# Patient Record
Sex: FEMALE | Race: WHITE | NOT HISPANIC OR LATINO | URBAN - METROPOLITAN AREA
[De-identification: names, ages, dates, MRNs, and addresses within clinical notes are randomized per-mention and may not be internally consistent; named-entity substitution may affect disease eponyms.]

---

## 2017-05-01 ENCOUNTER — OUTPATIENT (OUTPATIENT)
Dept: OUTPATIENT SERVICES | Facility: HOSPITAL | Age: 72
LOS: 1 days | End: 2017-05-01
Payer: COMMERCIAL

## 2017-05-01 ENCOUNTER — APPOINTMENT (OUTPATIENT)
Dept: MRI IMAGING | Facility: IMAGING CENTER | Age: 72
End: 2017-05-01

## 2017-05-01 DIAGNOSIS — Z00.8 ENCOUNTER FOR OTHER GENERAL EXAMINATION: ICD-10-CM

## 2017-05-01 PROCEDURE — 72148 MRI LUMBAR SPINE W/O DYE: CPT

## 2023-11-14 ENCOUNTER — EMERGENCY (EMERGENCY)
Facility: HOSPITAL | Age: 78
LOS: 1 days | Discharge: HOME | End: 2023-11-14
Attending: STUDENT IN AN ORGANIZED HEALTH CARE EDUCATION/TRAINING PROGRAM
Payer: MEDICARE

## 2023-11-14 VITALS
RESPIRATION RATE: 14 BRPM | TEMPERATURE: 98 F | OXYGEN SATURATION: 99 % | DIASTOLIC BLOOD PRESSURE: 85 MMHG | HEART RATE: 87 BPM | SYSTOLIC BLOOD PRESSURE: 162 MMHG

## 2023-11-14 VITALS
HEART RATE: 95 BPM | DIASTOLIC BLOOD PRESSURE: 86 MMHG | TEMPERATURE: 98 F | RESPIRATION RATE: 16 BRPM | OXYGEN SATURATION: 98 % | SYSTOLIC BLOOD PRESSURE: 176 MMHG | HEIGHT: 64 IN | WEIGHT: 125 LBS

## 2023-11-14 LAB
ALBUMIN SERPL ELPH-MCNC: 4.4 G/DL — SIGNIFICANT CHANGE UP (ref 3.3–5)
ALBUMIN SERPL ELPH-MCNC: 4.4 G/DL — SIGNIFICANT CHANGE UP (ref 3.3–5)
ALP SERPL-CCNC: 70 U/L — SIGNIFICANT CHANGE UP (ref 40–120)
ALP SERPL-CCNC: 70 U/L — SIGNIFICANT CHANGE UP (ref 40–120)
ALT FLD-CCNC: 14 U/L — SIGNIFICANT CHANGE UP (ref 10–45)
ALT FLD-CCNC: 14 U/L — SIGNIFICANT CHANGE UP (ref 10–45)
ANION GAP SERPL CALC-SCNC: 12 MMOL/L — SIGNIFICANT CHANGE UP (ref 5–17)
ANION GAP SERPL CALC-SCNC: 12 MMOL/L — SIGNIFICANT CHANGE UP (ref 5–17)
APTT BLD: 33.4 SEC — SIGNIFICANT CHANGE UP (ref 24.5–35.6)
APTT BLD: 33.4 SEC — SIGNIFICANT CHANGE UP (ref 24.5–35.6)
AST SERPL-CCNC: 23 U/L — SIGNIFICANT CHANGE UP (ref 10–40)
AST SERPL-CCNC: 23 U/L — SIGNIFICANT CHANGE UP (ref 10–40)
BASE EXCESS BLDV CALC-SCNC: 0.8 MMOL/L — SIGNIFICANT CHANGE UP (ref -2–3)
BASE EXCESS BLDV CALC-SCNC: 0.8 MMOL/L — SIGNIFICANT CHANGE UP (ref -2–3)
BASOPHILS # BLD AUTO: 0.02 K/UL — SIGNIFICANT CHANGE UP (ref 0–0.2)
BASOPHILS # BLD AUTO: 0.02 K/UL — SIGNIFICANT CHANGE UP (ref 0–0.2)
BASOPHILS NFR BLD AUTO: 0.5 % — SIGNIFICANT CHANGE UP (ref 0–2)
BASOPHILS NFR BLD AUTO: 0.5 % — SIGNIFICANT CHANGE UP (ref 0–2)
BILIRUB SERPL-MCNC: 0.3 MG/DL — SIGNIFICANT CHANGE UP (ref 0.2–1.2)
BILIRUB SERPL-MCNC: 0.3 MG/DL — SIGNIFICANT CHANGE UP (ref 0.2–1.2)
BUN SERPL-MCNC: 9 MG/DL — SIGNIFICANT CHANGE UP (ref 7–23)
BUN SERPL-MCNC: 9 MG/DL — SIGNIFICANT CHANGE UP (ref 7–23)
CA-I SERPL-SCNC: 1.2 MMOL/L — SIGNIFICANT CHANGE UP (ref 1.15–1.33)
CA-I SERPL-SCNC: 1.2 MMOL/L — SIGNIFICANT CHANGE UP (ref 1.15–1.33)
CALCIUM SERPL-MCNC: 9.2 MG/DL — SIGNIFICANT CHANGE UP (ref 8.4–10.5)
CALCIUM SERPL-MCNC: 9.2 MG/DL — SIGNIFICANT CHANGE UP (ref 8.4–10.5)
CHLORIDE BLDV-SCNC: 104 MMOL/L — SIGNIFICANT CHANGE UP (ref 96–108)
CHLORIDE BLDV-SCNC: 104 MMOL/L — SIGNIFICANT CHANGE UP (ref 96–108)
CHLORIDE SERPL-SCNC: 105 MMOL/L — SIGNIFICANT CHANGE UP (ref 96–108)
CHLORIDE SERPL-SCNC: 105 MMOL/L — SIGNIFICANT CHANGE UP (ref 96–108)
CO2 BLDV-SCNC: 27 MMOL/L — HIGH (ref 22–26)
CO2 BLDV-SCNC: 27 MMOL/L — HIGH (ref 22–26)
CO2 SERPL-SCNC: 24 MMOL/L — SIGNIFICANT CHANGE UP (ref 22–31)
CO2 SERPL-SCNC: 24 MMOL/L — SIGNIFICANT CHANGE UP (ref 22–31)
CREAT SERPL-MCNC: 0.58 MG/DL — SIGNIFICANT CHANGE UP (ref 0.5–1.3)
CREAT SERPL-MCNC: 0.58 MG/DL — SIGNIFICANT CHANGE UP (ref 0.5–1.3)
EGFR: 93 ML/MIN/1.73M2 — SIGNIFICANT CHANGE UP
EGFR: 93 ML/MIN/1.73M2 — SIGNIFICANT CHANGE UP
EOSINOPHIL # BLD AUTO: 0.04 K/UL — SIGNIFICANT CHANGE UP (ref 0–0.5)
EOSINOPHIL # BLD AUTO: 0.04 K/UL — SIGNIFICANT CHANGE UP (ref 0–0.5)
EOSINOPHIL NFR BLD AUTO: 1 % — SIGNIFICANT CHANGE UP (ref 0–6)
EOSINOPHIL NFR BLD AUTO: 1 % — SIGNIFICANT CHANGE UP (ref 0–6)
GAS PNL BLDV: 136 MMOL/L — SIGNIFICANT CHANGE UP (ref 136–145)
GAS PNL BLDV: 136 MMOL/L — SIGNIFICANT CHANGE UP (ref 136–145)
GAS PNL BLDV: SIGNIFICANT CHANGE UP
GLUCOSE BLDC GLUCOMTR-MCNC: 107 MG/DL — HIGH (ref 70–99)
GLUCOSE BLDC GLUCOMTR-MCNC: 107 MG/DL — HIGH (ref 70–99)
GLUCOSE BLDV-MCNC: 104 MG/DL — HIGH (ref 70–99)
GLUCOSE BLDV-MCNC: 104 MG/DL — HIGH (ref 70–99)
GLUCOSE SERPL-MCNC: 105 MG/DL — HIGH (ref 70–99)
GLUCOSE SERPL-MCNC: 105 MG/DL — HIGH (ref 70–99)
HCO3 BLDV-SCNC: 26 MMOL/L — SIGNIFICANT CHANGE UP (ref 22–29)
HCO3 BLDV-SCNC: 26 MMOL/L — SIGNIFICANT CHANGE UP (ref 22–29)
HCT VFR BLD CALC: 38.1 % — SIGNIFICANT CHANGE UP (ref 34.5–45)
HCT VFR BLD CALC: 38.1 % — SIGNIFICANT CHANGE UP (ref 34.5–45)
HCT VFR BLDA CALC: 39 % — SIGNIFICANT CHANGE UP (ref 34.5–46.5)
HCT VFR BLDA CALC: 39 % — SIGNIFICANT CHANGE UP (ref 34.5–46.5)
HGB BLD CALC-MCNC: 12.9 G/DL — SIGNIFICANT CHANGE UP (ref 11.7–16.1)
HGB BLD CALC-MCNC: 12.9 G/DL — SIGNIFICANT CHANGE UP (ref 11.7–16.1)
HGB BLD-MCNC: 12.6 G/DL — SIGNIFICANT CHANGE UP (ref 11.5–15.5)
HGB BLD-MCNC: 12.6 G/DL — SIGNIFICANT CHANGE UP (ref 11.5–15.5)
INR BLD: 1.04 RATIO — SIGNIFICANT CHANGE UP (ref 0.85–1.18)
INR BLD: 1.04 RATIO — SIGNIFICANT CHANGE UP (ref 0.85–1.18)
LACTATE BLDV-MCNC: 0.8 MMOL/L — SIGNIFICANT CHANGE UP (ref 0.5–2)
LACTATE BLDV-MCNC: 0.8 MMOL/L — SIGNIFICANT CHANGE UP (ref 0.5–2)
LYMPHOCYTES # BLD AUTO: 1.15 K/UL — SIGNIFICANT CHANGE UP (ref 1–3.3)
LYMPHOCYTES # BLD AUTO: 1.15 K/UL — SIGNIFICANT CHANGE UP (ref 1–3.3)
LYMPHOCYTES # BLD AUTO: 28.3 % — SIGNIFICANT CHANGE UP (ref 13–44)
LYMPHOCYTES # BLD AUTO: 28.3 % — SIGNIFICANT CHANGE UP (ref 13–44)
MCHC RBC-ENTMCNC: 28.9 PG — SIGNIFICANT CHANGE UP (ref 27–34)
MCHC RBC-ENTMCNC: 28.9 PG — SIGNIFICANT CHANGE UP (ref 27–34)
MCHC RBC-ENTMCNC: 33.1 GM/DL — SIGNIFICANT CHANGE UP (ref 32–36)
MCHC RBC-ENTMCNC: 33.1 GM/DL — SIGNIFICANT CHANGE UP (ref 32–36)
MCV RBC AUTO: 87.4 FL — SIGNIFICANT CHANGE UP (ref 80–100)
MCV RBC AUTO: 87.4 FL — SIGNIFICANT CHANGE UP (ref 80–100)
MONOCYTES # BLD AUTO: 0.36 K/UL — SIGNIFICANT CHANGE UP (ref 0–0.9)
MONOCYTES # BLD AUTO: 0.36 K/UL — SIGNIFICANT CHANGE UP (ref 0–0.9)
MONOCYTES NFR BLD AUTO: 8.8 % — SIGNIFICANT CHANGE UP (ref 2–14)
MONOCYTES NFR BLD AUTO: 8.8 % — SIGNIFICANT CHANGE UP (ref 2–14)
NEUTROPHILS # BLD AUTO: 2.5 K/UL — SIGNIFICANT CHANGE UP (ref 1.8–7.4)
NEUTROPHILS # BLD AUTO: 2.5 K/UL — SIGNIFICANT CHANGE UP (ref 1.8–7.4)
NEUTROPHILS NFR BLD AUTO: 61.4 % — SIGNIFICANT CHANGE UP (ref 43–77)
NEUTROPHILS NFR BLD AUTO: 61.4 % — SIGNIFICANT CHANGE UP (ref 43–77)
NRBC # BLD: 0 /100 WBCS — SIGNIFICANT CHANGE UP (ref 0–0)
NRBC # BLD: 0 /100 WBCS — SIGNIFICANT CHANGE UP (ref 0–0)
PCO2 BLDV: 43 MMHG — HIGH (ref 39–42)
PCO2 BLDV: 43 MMHG — HIGH (ref 39–42)
PH BLDV: 7.39 — SIGNIFICANT CHANGE UP (ref 7.32–7.43)
PH BLDV: 7.39 — SIGNIFICANT CHANGE UP (ref 7.32–7.43)
PLATELET # BLD AUTO: 207 K/UL — SIGNIFICANT CHANGE UP (ref 150–400)
PLATELET # BLD AUTO: 207 K/UL — SIGNIFICANT CHANGE UP (ref 150–400)
PO2 BLDV: 35 MMHG — SIGNIFICANT CHANGE UP (ref 25–45)
PO2 BLDV: 35 MMHG — SIGNIFICANT CHANGE UP (ref 25–45)
POTASSIUM BLDV-SCNC: 3.8 MMOL/L — SIGNIFICANT CHANGE UP (ref 3.5–5.1)
POTASSIUM BLDV-SCNC: 3.8 MMOL/L — SIGNIFICANT CHANGE UP (ref 3.5–5.1)
POTASSIUM SERPL-MCNC: 3.9 MMOL/L — SIGNIFICANT CHANGE UP (ref 3.5–5.3)
POTASSIUM SERPL-MCNC: 3.9 MMOL/L — SIGNIFICANT CHANGE UP (ref 3.5–5.3)
POTASSIUM SERPL-SCNC: 3.9 MMOL/L — SIGNIFICANT CHANGE UP (ref 3.5–5.3)
POTASSIUM SERPL-SCNC: 3.9 MMOL/L — SIGNIFICANT CHANGE UP (ref 3.5–5.3)
PROT SERPL-MCNC: 7 G/DL — SIGNIFICANT CHANGE UP (ref 6–8.3)
PROT SERPL-MCNC: 7 G/DL — SIGNIFICANT CHANGE UP (ref 6–8.3)
PROTHROM AB SERPL-ACNC: 10.9 SEC — SIGNIFICANT CHANGE UP (ref 9.5–13)
PROTHROM AB SERPL-ACNC: 10.9 SEC — SIGNIFICANT CHANGE UP (ref 9.5–13)
RBC # BLD: 4.36 M/UL — SIGNIFICANT CHANGE UP (ref 3.8–5.2)
RBC # BLD: 4.36 M/UL — SIGNIFICANT CHANGE UP (ref 3.8–5.2)
RBC # FLD: 13.1 % — SIGNIFICANT CHANGE UP (ref 10.3–14.5)
RBC # FLD: 13.1 % — SIGNIFICANT CHANGE UP (ref 10.3–14.5)
SAO2 % BLDV: 59.7 % — LOW (ref 67–88)
SAO2 % BLDV: 59.7 % — LOW (ref 67–88)
SODIUM SERPL-SCNC: 141 MMOL/L — SIGNIFICANT CHANGE UP (ref 135–145)
SODIUM SERPL-SCNC: 141 MMOL/L — SIGNIFICANT CHANGE UP (ref 135–145)
TROPONIN T, HIGH SENSITIVITY RESULT: 7 NG/L — SIGNIFICANT CHANGE UP (ref 0–51)
TROPONIN T, HIGH SENSITIVITY RESULT: 7 NG/L — SIGNIFICANT CHANGE UP (ref 0–51)
WBC # BLD: 4.07 K/UL — SIGNIFICANT CHANGE UP (ref 3.8–10.5)
WBC # BLD: 4.07 K/UL — SIGNIFICANT CHANGE UP (ref 3.8–10.5)
WBC # FLD AUTO: 4.07 K/UL — SIGNIFICANT CHANGE UP (ref 3.8–10.5)
WBC # FLD AUTO: 4.07 K/UL — SIGNIFICANT CHANGE UP (ref 3.8–10.5)

## 2023-11-14 PROCEDURE — 82435 ASSAY OF BLOOD CHLORIDE: CPT

## 2023-11-14 PROCEDURE — 93005 ELECTROCARDIOGRAM TRACING: CPT

## 2023-11-14 PROCEDURE — 70498 CT ANGIOGRAPHY NECK: CPT | Mod: 26,MA

## 2023-11-14 PROCEDURE — 83605 ASSAY OF LACTIC ACID: CPT

## 2023-11-14 PROCEDURE — 82962 GLUCOSE BLOOD TEST: CPT

## 2023-11-14 PROCEDURE — 84132 ASSAY OF SERUM POTASSIUM: CPT

## 2023-11-14 PROCEDURE — 84484 ASSAY OF TROPONIN QUANT: CPT

## 2023-11-14 PROCEDURE — 82803 BLOOD GASES ANY COMBINATION: CPT

## 2023-11-14 PROCEDURE — 80053 COMPREHEN METABOLIC PANEL: CPT

## 2023-11-14 PROCEDURE — 99291 CRITICAL CARE FIRST HOUR: CPT | Mod: 25

## 2023-11-14 PROCEDURE — 70450 CT HEAD/BRAIN W/O DYE: CPT | Mod: MA

## 2023-11-14 PROCEDURE — 70496 CT ANGIOGRAPHY HEAD: CPT | Mod: 26,MA

## 2023-11-14 PROCEDURE — 82947 ASSAY GLUCOSE BLOOD QUANT: CPT

## 2023-11-14 PROCEDURE — 85014 HEMATOCRIT: CPT

## 2023-11-14 PROCEDURE — 85730 THROMBOPLASTIN TIME PARTIAL: CPT

## 2023-11-14 PROCEDURE — 99291 CRITICAL CARE FIRST HOUR: CPT

## 2023-11-14 PROCEDURE — 0042T: CPT | Mod: MA

## 2023-11-14 PROCEDURE — 84295 ASSAY OF SERUM SODIUM: CPT

## 2023-11-14 PROCEDURE — 85610 PROTHROMBIN TIME: CPT

## 2023-11-14 PROCEDURE — 85018 HEMOGLOBIN: CPT

## 2023-11-14 PROCEDURE — 85025 COMPLETE CBC W/AUTO DIFF WBC: CPT

## 2023-11-14 PROCEDURE — 70496 CT ANGIOGRAPHY HEAD: CPT | Mod: MA

## 2023-11-14 PROCEDURE — 70498 CT ANGIOGRAPHY NECK: CPT | Mod: MA

## 2023-11-14 PROCEDURE — 82330 ASSAY OF CALCIUM: CPT

## 2023-11-14 PROCEDURE — 70450 CT HEAD/BRAIN W/O DYE: CPT | Mod: 26,MA,XU

## 2023-11-14 RX ORDER — MECLIZINE HCL 12.5 MG
25 TABLET ORAL ONCE
Refills: 0 | Status: COMPLETED | OUTPATIENT
Start: 2023-11-14 | End: 2023-11-14

## 2023-11-14 RX ORDER — SODIUM CHLORIDE 9 MG/ML
1000 INJECTION INTRAMUSCULAR; INTRAVENOUS; SUBCUTANEOUS ONCE
Refills: 0 | Status: COMPLETED | OUTPATIENT
Start: 2023-11-14 | End: 2023-11-14

## 2023-11-14 RX ORDER — MECLIZINE HCL 12.5 MG
1 TABLET ORAL
Qty: 9 | Refills: 0
Start: 2023-11-14 | End: 2023-11-16

## 2023-11-14 RX ADMIN — Medication 25 MILLIGRAM(S): at 11:24

## 2023-11-14 RX ADMIN — SODIUM CHLORIDE 1000 MILLILITER(S): 9 INJECTION INTRAMUSCULAR; INTRAVENOUS; SUBCUTANEOUS at 11:24

## 2023-11-14 NOTE — ED PROVIDER NOTE - PROGRESS NOTE DETAILS
Chan DUMONT: Pt is stable and ready for discharge. Strict return precautions given. All questions answered. Pt is able to ambulate to the restroom and feels better. Pt's son will help pt at home w/ ambulation. Meclizine sent to pharmacy. Chan DUMONT: Pt is stable and ready for discharge. Strict return precautions given. All questions answered. Pt is able to ambulate to the restroom and feels better. Pt's son will help pt at home w/ ambulation. Meclizine sent to pharmacy. Pt has had a thyroid ultrasound this past week for her nodule. Patient reassessed, feels better after meclizine/IVF, ambulatory w/o assistance with steady gait. Discussed case with neurology, impression likely peripheral vertigo, recommending outpatient follow-up. Imaging notable for 4mm aneurysm on CTA, likely incidental finding. Given referral for outpatient neurosurgery. -Ivette Sparks MD (Attending)

## 2023-11-14 NOTE — ED PROVIDER NOTE - NSFOLLOWUPCLINICS_GEN_ALL_ED_FT
[facility]  Neurosurgery  [address], [address]  [place] [address]  Phone: [phone]  Fax:   Follow Up Time: 1-3 Days    [facility]  Neurology  [address] [place] [id number]  Phone: [phone]  Fax:   Follow Up Time: 1-3 Days

## 2023-11-14 NOTE — CONSULT NOTE ADULT - ASSESSMENT
Impression: Sudden onset intermittent positional vertigo with left sided hearing without nystagmus, ataxia, ear symptoms, other focal neurological findings is likely ....     Recommendations:  []Meclizine  []IVF  []Outpatient ENT  []PT/Vestibular Rehab referral  ...........TNK pending CTH and d/w fellow    prelim note  not yet d/w fellow  78 Woman PMHx Dorsalgia p/w new onset, intermittent positional vertigo. CTH/CTP wnl save for thyroid nodules. CTA with 4mm rMCA bifurcation aneurysm     Impression: Sudden onset intermittent positional vertigo without nystagmus, ataxia, ear symptoms, focal neurological deficits, nor reproducible hearing changes is likely peripheral in nature    Recommendations:  []Meclizine 25mg TID  []IV hydration  []Outpatient ENT Referral  []PT/Vestibular Rehab referral  []Outpatient neurosurgery referral for 4 mm aneurysm at the right MCA bifurcation.  []Thyroid nodule management per primary team  []If patient improves and can ambulate without difficulty, no further inpatient neurological workup/treatment indicated   []Please provide information for [email or web address]    Case discussed with elizabeth fellow Dr. Sidney Long under supervision of stroke attending Dr. Deirdre Garcia 78 Woman PMHx Dorsalgia p/w new onset, intermittent positional vertigo. CTH/CTP wnl except for thyroid nodules. CTA with 4mm rMCA bifurcation aneurysm     Impression:   #peripheral vertigo  -Sudden onset intermittent positional vertigo without nystagmus, ataxia, ear symptoms, focal neurological deficits, nor reproducible hearing changes is likely peripheral in nature  #4mm rMCA bifurcation aneurysm     Recommendations:  []Meclizine 25mg TID  []IV hydration  []Outpatient ENT Referral  []PT/Vestibular Rehab referral  []Outpatient neurosurgery referral for 4 mm aneurysm at the right MCA bifurcation.  []Thyroid nodule management per primary team  []If patient improves and can ambulate without difficulty, no further inpatient neurological workup/treatment indicated   []Please provide information for [email or web address]    Case discussed with stroke fellow Dr. Sidney Long under supervision of stroke attending Dr. Deirdre Garcia Brenda Pitt

## 2023-11-14 NOTE — CONSULT NOTE ADULT - SUBJECTIVE AND OBJECTIVE BOX
Neurology - Consult Note    -  Spectra: [phone] ([facility]), [phone] ([facility])  -    HPI: Patient JOSE ARMANDO WAY is a 78y (1945) wo/man with a PMHx significant for ***    (Stroke only)  LKN:  NIHSS:   preMRS:   Pt is not a candidate for tenecteplase due to [outside tenecteplase window / mild, non-disabling deficit]  Pt is not a candidate for mechanical thrombectomy due to no large vessel occlusion on CTA    Review of Systems:  INCOMPLETE   CONSTITUTIONAL: No fevers or chills  EYES AND ENT: No visual changes or no throat pain   NECK: No pain or stiffness  RESPIRATORY: No hemoptysis or shortness of breath  CARDIOVASCULAR: No chest pain or palpitations  GASTROINTESTINAL: No melena or hematochezia  GENITOURINARY: No dysuria or hematuria  NEUROLOGICAL: +As stated in HPI above  SKIN: No itching, burning, rashes, or lesions   All other review of systems is negative unless indicated above.    Allergies:  No Known Allergies      PMHx/PSHx/Family Hx: As above, otherwise see below   No pertinent past medical history        Social Hx:  No current use of tobacco, alcohol, or illicit drugs  Lives with ***    Medications:  MEDICATIONS  (STANDING):    MEDICATIONS  (PRN):      Vitals:  T(C): 36.7 (11-14-23 @ 10:18), Max: 36.7 (11-14-23 @ 10:18)  HR: 95 (11-14-23 @ 10:18) (95 - 95)  BP: 176/86 (11-14-23 @ 10:18) (176/86 - 176/86)  RR: 16 (11-14-23 @ 10:18) (16 - 16)  SpO2: 98% (11-14-23 @ 10:18) (98% - 98%)    Physical Examination: INCOMPLETE  General - NAD  Cardiovascular - Peripheral pulses palpable, no edema  Eyes - Fundoscopy with flat, sharp optic discs and no hemorrhage or exudates; Fundoscopy not well visualized; Fundoscopy not performed due to safety precautions in the setting of the COVID-19 pandemic    Neurologic Exam:  Mental status - Awake, Alert, Oriented to person, place, and time. Speech fluent, repetition and naming intact. Follows simple and complex commands. Attention/concentration, recent and remote memory (including registration and recall), and fund of knowledge intact    Cranial nerves - PERRLA, VFF, EOMI, face sensation (V1-V3) intact b/l, facial strength intact without asymmetry b/l, hearing intact b/l, palate with symmetric elevation, trapezius OR sternocleidomastiod 5/5 strength b/l, tongue midline on protrusion with full lateral movement    Motor - Normal bulk and tone throughout. No pronator drift.  Strength testing            Deltoid      Biceps      Triceps     Wrist Extension    Wrist Flexion     Interossei         R            5                 5               5                     5                              5                        5                 5  L             5                 5               5                     5                              5                        5                 5              Hip Flexion    Hip Extension    Knee Flexion    Knee Extension    Dorsiflexion    Plantar Flexion  R              5                           5                       5                           5                            5                          5  L              5                           5                        5                           5                            5                          5    Sensation - Light touch/temperature OR pain/vibration intact throughout    DTR's -             Biceps      Triceps     Brachioradialis      Patellar    Ankle    Toes/plantar response  R             2+             2+                  2+                       2+            2+                 Down  L              2+             2+                 2+                        2+           2+                 Down    Coordination - Finger to Nose intact b/l. No tremors appreciated    Gait and station - Normal casual gait. Romberg (-)    Labs:                        12.6   4.07  )-----------( 207      ( 14 Nov 2023 10:40 )             38.1           CAPILLARY BLOOD GLUCOSE              CSF:                  Radiology:     Neurology - Consult Note    -  Spectra: [phone] ([facility]), [phone] ([facility])  -    HPI: Patient JOSE ARMANDO WAY is a 78y (1945) wo/man with a PMHx significant for ***    (Stroke only)  LKN:  NIHSS:   preMRS:   Pt is not a candidate for tenecteplase due to [outside tenecteplase window / mild, non-disabling deficit]  Pt is not a candidate for mechanical thrombectomy due to no large vessel occlusion on CTA    Review of Systems:  INCOMPLETE   CONSTITUTIONAL: No fevers or chills  EYES AND ENT: No visual changes or no throat pain   NECK: No pain or stiffness  RESPIRATORY: No hemoptysis or shortness of breath  CARDIOVASCULAR: No chest pain or palpitations  GASTROINTESTINAL: No melena or hematochezia  GENITOURINARY: No dysuria or hematuria  NEUROLOGICAL: +As stated in HPI above  SKIN: No itching, burning, rashes, or lesions   All other review of systems is negative unless indicated above.    Allergies:  No Known Allergies      PMHx/PSHx/Family Hx: As above, otherwise see below   No pertinent past medical history        Social Hx:  No current use of tobacco, alcohol, or illicit drugs  Lives with ***    Medications:  MEDICATIONS  (STANDING):    MEDICATIONS  (PRN):      Vitals:  T(C): 36.7 (11-14-23 @ 10:18), Max: 36.7 (11-14-23 @ 10:18)  HR: 95 (11-14-23 @ 10:18) (95 - 95)  BP: 176/86 (11-14-23 @ 10:18) (176/86 - 176/86)  RR: 16 (11-14-23 @ 10:18) (16 - 16)  SpO2: 98% (11-14-23 @ 10:18) (98% - 98%)    Physical Examination: INCOMPLETE  General - NAD  Cardiovascular - Peripheral pulses palpable, no edema  Eyes - Fundoscopy with flat, sharp optic discs and no hemorrhage or exudates; Fundoscopy not well visualized; Fundoscopy not performed due to safety precautions in the setting of the COVID-19 pandemic    Neurologic Exam:  Mental status - Awake, Alert, Oriented to person, place, and time. Speech fluent, repetition and naming intact. Follows simple and complex commands. Attention/concentration, recent and remote memory (including registration and recall), and fund of knowledge intact    Cranial nerves - PERRLA, VFF, EOMI, face sensation (V1-V3) intact b/l, facial strength intact without asymmetry b/l, hearing intact b/l, palate with symmetric elevation, trapezius OR sternocleidomastiod 5/5 strength b/l, tongue midline on protrusion with full lateral movement    Motor - Normal bulk and tone throughout. No pronator drift.  Strength testing            Deltoid      Biceps      Triceps     Wrist Extension    Wrist Flexion     Interossei         R            5                 5               5                     5                              5                        5                 5  L             5                 5               5                     5                              5                        5                 5              Hip Flexion    Hip Extension    Knee Flexion    Knee Extension    Dorsiflexion    Plantar Flexion  R              5                           5                       5                           5                            5                          5  L              5                           5                        5                           5                            5                          5    Sensation - Light touch/temperature OR pain/vibration intact throughout    DTR's -             Biceps      Triceps     Brachioradialis      Patellar    Ankle    Toes/plantar response  R             2+             2+                  2+                       2+            2+                 Down  L              2+             2+                 2+                        2+           2+                 Down    Coordination - Finger to Nose intact b/l. No tremors appreciated    Gait and station - Normal casual gait. Romberg (-)    Labs:                        12.6   4.07  )-----------( 207      ( 14 Nov 2023 10:40 )             38.1           CAPILLARY BLOOD GLUCOSE              CSF:                  Radiology:     Neurology - Consult Note    -  Spectra: [phone] ([facility]), [phone] ([facility])  -    HPI: Patient JOSE ARMANDO WAY is a 78y (1945) wo/man with a PMHx significant for ***    (Stroke only)  LKN:  NIHSS:   preMRS:   Pt is not a candidate for tenecteplase due to [outside tenecteplase window / mild, non-disabling deficit]  Pt is not a candidate for mechanical thrombectomy due to no large vessel occlusion on CTA    Review of Systems:  INCOMPLETE   CONSTITUTIONAL: No fevers or chills  EYES AND ENT: No visual changes or no throat pain   NECK: No pain or stiffness  RESPIRATORY: No hemoptysis or shortness of breath  CARDIOVASCULAR: No chest pain or palpitations  GASTROINTESTINAL: No melena or hematochezia  GENITOURINARY: No dysuria or hematuria  NEUROLOGICAL: +As stated in HPI above  SKIN: No itching, burning, rashes, or lesions   All other review of systems is negative unless indicated above.    Allergies:  No Known Allergies      PMHx/PSHx/Family Hx: As above, otherwise see below   No pertinent past medical history        Social Hx:  No current use of tobacco, alcohol, or illicit drugs  Lives with ***    Medications:  MEDICATIONS  (STANDING):    MEDICATIONS  (PRN):      Vitals:  T(C): 36.7 (11-14-23 @ 10:18), Max: 36.7 (11-14-23 @ 10:18)  HR: 95 (11-14-23 @ 10:18) (95 - 95)  BP: 176/86 (11-14-23 @ 10:18) (176/86 - 176/86)  RR: 16 (11-14-23 @ 10:18) (16 - 16)  SpO2: 98% (11-14-23 @ 10:18) (98% - 98%)    Physical Examination: INCOMPLETE  General - NAD  Cardiovascular - Peripheral pulses palpable, no edema  Eyes - Fundoscopy with flat, sharp optic discs and no hemorrhage or exudates; Fundoscopy not well visualized; Fundoscopy not performed due to safety precautions in the setting of the COVID-19 pandemic    Neurologic Exam:  Mental status - Awake, Alert, Oriented to person, place, and time. Speech fluent, repetition and naming intact. Follows simple and complex commands. Attention/concentration, recent and remote memory (including registration and recall), and fund of knowledge intact    Cranial nerves - PERRLA, VFF, EOMI, face sensation (V1-V3) intact b/l, facial strength intact without asymmetry b/l, hearing intact b/l, palate with symmetric elevation, trapezius OR sternocleidomastiod 5/5 strength b/l, tongue midline on protrusion with full lateral movement    Motor - Normal bulk and tone throughout. No pronator drift.  Strength testing            Deltoid      Biceps      Triceps     Wrist Extension    Wrist Flexion     Interossei         R            5                 5               5                     5                              5                        5                 5  L             5                 5               5                     5                              5                        5                 5              Hip Flexion    Hip Extension    Knee Flexion    Knee Extension    Dorsiflexion    Plantar Flexion  R              5                           5                       5                           5                            5                          5  L              5                           5                        5                           5                            5                          5    Sensation - Light touch/temperature OR pain/vibration intact throughout    DTR's -             Biceps      Triceps     Brachioradialis      Patellar    Ankle    Toes/plantar response  R             2+             2+                  2+                       2+            2+                 Down  L              2+             2+                 2+                        2+           2+                 Down    Coordination - Finger to Nose intact b/l. No tremors appreciated    Gait and station - Normal casual gait. Romberg (-)    Labs:                        12.6   4.07  )-----------( 207      ( 14 Nov 2023 10:40 )             38.1           CAPILLARY BLOOD GLUCOSE              CSF:                  Radiology:     Neurology - Consult Note    -  Spectra: [phone] ([facility]), [phone] ([facility])  -    HPI: Patient JOSE ARMANDO WAY is a 78y (1945) woman with a PMHx significant for lower back pain (takes Aleve and Tylenol as needed) presents with sudden onset vertigo and gait disequilibrium.  She awoke at 5: 00 11/14/2023 and at around 7: 00 she experienced room spinning sensation and persistance of chornic bakc/neck pain. on exam and she noticed worse left-sided hearing loss, however on repeat exam it was worse on the right and internal was with variable reproductive reproducibility.  Otherwise she endorses chronic bilateral hearing issues.  She denies falls, clumsiness, dropping things, tinnitus, ear fullness, ear pain, vision loss, blurry vision, double vision, weakness, sensory changes.  Notes headache at first which is described as her chronic back and neck radiating to her head however this resolves a little bit chronic throughout encounter.  Her vertigo resolved after several minutes and recurs when she moves and resolves when she rests.  Vertigo is associated with "wobbly gait "    NKDA  Takes alleve (not in last 24 hours) and tylenol (2 this am d/t back pain) prn, otherwise no daily medications  no ac/ap    NIH stroke scale: 0  Last known normal 11/14/2307: 00  Not a tenecteplase candidate due to symptoms not's consistent with ischemic stroke  Thrombectomy candidate due to no LVO on CTA  preMRS 0    Review of Systems:    CONSTITUTIONAL: No fevers, always has chills  EYES AND ENT: No visual changes or no throat pain   NECK: +chronic neck pain  RESPIRATORY: No hemoptysis or shortness of breath  CARDIOVASCULAR: No chest pain or palpitations  GASTROINTESTINAL: No melena or hematochezia  GENITOURINARY: No dysuria or hematuria  NEUROLOGICAL: +As stated in HPI above  SKIN: No itching, burning, rashes, or lesions   All other review of systems is negative unless indicated above.    Allergies:  No Known Allergies      PMHx/PSHx/Family Hx: As above, otherwise see below   No pertinent past medical history        Social Hx:  No current use of tobacco, alcohol, or illicit drugs      Medications:  MEDICATIONS  (STANDING):    MEDICATIONS  (PRN):      Vitals:  T(C): 36.7 (11-14-23 @ 10:18), Max: 36.7 (11-14-23 @ 10:18)  HR: 95 (11-14-23 @ 10:18) (95 - 95)  BP: 176/86 (11-14-23 @ 10:18) (176/86 - 176/86)  RR: 16 (11-14-23 @ 10:18) (16 - 16)  SpO2: 98% (11-14-23 @ 10:18) (98% - 98%)    Physical Examination: INCOMPLETE  General - NAD  Cardiovascular - Peripheral pulses palpable, no edema  Eyes - Fundoscopy with flat, sharp optic discs and no hemorrhage or exudates; Fundoscopy not well visualized; Fundoscopy not performed due to safety precautions in the setting of the COVID-19 pandemic    Neurologic Exam:  Mental status - Awake, Alert, Oriented to person, place, and time. Speech fluent, repetition and naming intact. Follows simple and complex commands. Attention/concentration, recent and remote memory and fund of knowledge intact    Cranial nerves - PERRL, VFF, EOMI NO nystagmus, face sensation (V1-V3) intact b/l, facial strength intact without asymmetry b/l, hearing intact b/l, palate with symmetric elevation, trapezius 5/5 strength b/l, tongue midline on protrusion with full lateral movement    Motor - Normal bulk and tone throughout. No pronator drift.  Strength testing            Deltoid      Biceps      Triceps        R            5                 5               5                     5                             L             5                 5               5                     5                                          Hip Flexion    Hip Extension    Knee Flexion    Knee Extension    Dorsiflexion     R              5                           5                       5                           5                            5                       L              5                           5                        5                           5                            5                             Sensation - Light touch intact throughout    DTR's -             Biceps      Triceps     Brachioradialis      Patellar    Ankle      R             2+             2+                  2+                       2+            0+                   L              2+             2+                 2+                        2+           0+                     Coordination - Finger to Nose intact b/l. HS intact b/l No tremors appreciated    Gait and station -  False positive romberg. Eyes open falling firts to left then to back.    Wilmot hallpike negative b/l; was not symptomatic during test. When returned to seated position she became vertiginous again (<1min) without nystagmus also had intense neck pain (<2min in time)    Labs:                        12.6   4.07  )-----------( 207      ( 14 Nov 2023 10:40 )             38.1           CAPILLARY BLOOD GLUCOSE              CSF:                  Radiology:    ACC: [id number] EXAM:  CT BRAIN PERFUSION MAPS STROKE   ORDERED BY:   JUAN MENESES     ACC: [id number] EXAM:  CT ANGIO BRAIN STROKE PROTC IC   ORDERED BY:   JUAN MENESES     ACC: [id number] EXAM:  CT BRAIN STROKE PROTOCOL   ORDERED BY: JUAN MENESES     ACC: [id number] EXAM:  CT ANGIO NECK STROKE PROTCL IC   ORDERED BY:   JUAN MENESES     PROCEDURE DATE:  11/14/2023          INTERPRETATION:  HEAD CT, CT PERFUSION, CTA OF THE [place] OF WANG AND   NECK:    INDICATIONS: Stroke Code. Vertigo.    TECHNIQUE:    HEAD CT:    Serial axial images were obtained from the skull base to the vertex   without the use of intravenous contrast. RAPID artificial intelligence   was used for perfusion analysis and for preliminary evaluation of   intracranial hemorrhage.    CTA [place] OF WANG:    After the intravenous power injection of non-ionic contrast material,   serial thin sections were obtained through the intracranial circulation   on a multislice CT scanner.  Images were reformatted using a dedicated 3D   software package and viewed on a dedicated workstation in multiple   planes. MIP image analysis was performed on a separate workstation.    CTA NECK:    After the intravenous power injection of non-ionic contrast material,   serial thin sections were obtained through the cervical circulation on a   multislice CT scanner.  Images were reformatted using a dedicated 3D   software package and viewed on a dedicated workstation in multiple   planes. MIP image analysis was performed on a separate workstation.    CONTRAST: 120 CC Omnipaque 350 was administered. 5 CC was discarded.      COMPARISON EXAMINATION: CT head 8/1/2016    FINDINGS:    VENTRICLES AND SULCI: Ventricles and sulci are unremarkable for patient   age.  INTRA-AXIAL: No intracranial mass, acute hemorrhage, or midline shift is   present. Chronic appearing left caudate lacunar infarct.  EXTRA-AXIAL: No extra-axial fluid collection is present.  INTRACRANIAL HEMORRHAGE: None.    VISUALIZED SINUSES: No air-fluid levels are identified.  VISUALIZED MASTOIDS:  Clear  CALVARIUM:  Intact      CT RAPID PERFUSION:  None.    INFARCT CORE: 0 mL.    TISSUE AT RISK: 0 mL.    MISMATCH RATIO: None.      CTA [place] OF WANG:    ANTERIOR CIRCULATION    ICA  CAVERNOUS, SUPRACLINOID, BIFURCATION SEGMENTS: Patent without flow   limiting stenosis.    ANTERIOR CEREBRAL ARTERIES: Bilateral A1, anterior communicating and A2   anterior cerebral arteries are unremarkable in course and caliber without   flow limiting stenosis.    MIDDLE CEREBRAL ARTERIES: Patent bilateral M1, M2, and distal MCA   branches without flow limiting stenosis. There is a anteriorly projecting   focal outpouching at the right MCA bifurcation measuring 3.2 x 4.0 mm   consistent with an aneurysm.    POSTERIOR CIRCULATION:    VERTEBRAL ARTERIES: Patent without flow limiting stenosis    BASILAR ARTERY: Patent no flow limiting stenosis.    POSTERIOR CEREBRAL ARTERIES: Patent without flow limiting stenosis.    CTA NECK:    GREAT VESSELS: Visualized segments are patent, no flow limiting stenosis.    COMMON CAROTID ARTERIES:  RIGHT: Patent without flow limiting stenosis  LEFT: Patent without flow limiting stenosis    INTERNAL CAROTID ARTERIES:  RIGHT: Patent no evidence for any hemodynamically significant stenosis at   the ICA origin by NASCET criteria.  LEFT: Patent no evidence for any hemodynamically significant stenosis at   the ICA origin by NASCET criteria.    VERTEBRAL ARTERIES:    RIGHT: Patent no evidence for any flow limiting stenosis.  LEFT: Patent no evidence for any flow limiting stenosis.      SOFT TISSUES: There is a 3.3 x 3.0 x 3.5 cm low-density nodule in the   left lobe of thyroid gland.    BONES: Multilevel degenerative changes of cervical spine.    IMPRESSION:    HEAD CT: No acute intracranial hemorrhage or acute territorial infarction.    Notification to clinician of alert:  Dr. Bryce Oliveira was notified about the noncontrast head CT finding at   10:56 AM on 11/14/2023 with readback confirmation. The opportunity for   questions was provided and all questions asked were answered.    CT PERFUSION demonstrated: No asymmetric or territorial perfusion   abnormality.    If symptoms persist consider follow-up imaging with MRI of the brain if   no contraindications.     CTA COW:  Patent intracranial circulation without flow limiting stenosis   or large vessel occlusion. 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    CTA NECK: Patent, ECAs, ICAs, no  hemodynamically significant stenosis at    ICA origins by NASCET criteria.  Bilateral vertebral arteries are patent without flow limiting stenosis.    There is a 3.5 cm nodule in the left lobe of the thyroid gland. Recommend   nonemergent follow-up with thyroid ultrasound.    --- End of Report ---            JULIO CESAR LUA MD; Attending Radiologist  This document has been electronically signed. Nov 14 2023 11:43AM   Neurology - Consult Note    -  Spectra: [phone] ([facility]), [phone] ([facility])  -    HPI: Patient JOSE ARMANDO WAY is a 78y (1945) woman with a PMHx significant for lower back pain (takes Aleve and Tylenol as needed) presents with sudden onset vertigo and gait disequilibrium.  She awoke at 5: 00 11/14/2023 and at around 7: 00 she experienced room spinning sensation and persistance of chornic bakc/neck pain. on exam and she noticed worse left-sided hearing loss, however on repeat exam it was worse on the right and internal was with variable reproductive reproducibility.  Otherwise she endorses chronic bilateral hearing issues.  She denies falls, clumsiness, dropping things, tinnitus, ear fullness, ear pain, vision loss, blurry vision, double vision, weakness, sensory changes.  Notes headache at first which is described as her chronic back and neck radiating to her head however this resolves a little bit chronic throughout encounter.  Her vertigo resolved after several minutes and recurs when she moves and resolves when she rests.  Vertigo is associated with "wobbly gait "    NKDA  Takes alleve (not in last 24 hours) and tylenol (2 this am d/t back pain) prn, otherwise no daily medications  no ac/ap    NIH stroke scale: 0  Last known normal 11/14/2307: 00  Not a tenecteplase candidate due to symptoms not's consistent with ischemic stroke  Thrombectomy candidate due to no LVO on CTA  preMRS 0    Review of Systems:    CONSTITUTIONAL: No fevers, always has chills  EYES AND ENT: No visual changes or no throat pain   NECK: +chronic neck pain  RESPIRATORY: No hemoptysis or shortness of breath  CARDIOVASCULAR: No chest pain or palpitations  GASTROINTESTINAL: No melena or hematochezia  GENITOURINARY: No dysuria or hematuria  NEUROLOGICAL: +As stated in HPI above  SKIN: No itching, burning, rashes, or lesions   All other review of systems is negative unless indicated above.    Allergies:  No Known Allergies      PMHx/PSHx/Family Hx: As above, otherwise see below   No pertinent past medical history        Social Hx:  No current use of tobacco, alcohol, or illicit drugs      Medications:  MEDICATIONS  (STANDING):    MEDICATIONS  (PRN):      Vitals:  T(C): 36.7 (11-14-23 @ 10:18), Max: 36.7 (11-14-23 @ 10:18)  HR: 95 (11-14-23 @ 10:18) (95 - 95)  BP: 176/86 (11-14-23 @ 10:18) (176/86 - 176/86)  RR: 16 (11-14-23 @ 10:18) (16 - 16)  SpO2: 98% (11-14-23 @ 10:18) (98% - 98%)    Physical Examination: INCOMPLETE  General - NAD  Cardiovascular - Peripheral pulses palpable, no edema  Eyes - Fundoscopy with flat, sharp optic discs and no hemorrhage or exudates; Fundoscopy not well visualized; Fundoscopy not performed due to safety precautions in the setting of the COVID-19 pandemic    Neurologic Exam:  Mental status - Awake, Alert, Oriented to person, place, and time. Speech fluent, repetition and naming intact. Follows simple and complex commands. Attention/concentration, recent and remote memory and fund of knowledge intact    Cranial nerves - PERRL, VFF, EOMI NO nystagmus, face sensation (V1-V3) intact b/l, facial strength intact without asymmetry b/l, hearing intact b/l, palate with symmetric elevation, trapezius 5/5 strength b/l, tongue midline on protrusion with full lateral movement    Motor - Normal bulk and tone throughout. No pronator drift.  Strength testing            Deltoid      Biceps      Triceps        R            5                 5               5                     5                             L             5                 5               5                     5                                          Hip Flexion    Hip Extension    Knee Flexion    Knee Extension    Dorsiflexion     R              5                           5                       5                           5                            5                       L              5                           5                        5                           5                            5                             Sensation - Light touch intact throughout    DTR's -             Biceps      Triceps     Brachioradialis      Patellar    Ankle      R             2+             2+                  2+                       2+            0+                   L              2+             2+                 2+                        2+           0+                     Coordination - Finger to Nose intact b/l. HS intact b/l No tremors appreciated    Gait and station -  False positive romberg. Eyes open falling firts to left then to back.    [place] hallpike negative b/l; was not symptomatic during test. When returned to seated position she became vertiginous again (<1min) without nystagmus also had intense neck pain (<2min in time)    Labs:                        12.6   4.07  )-----------( 207      ( 14 Nov 2023 10:40 )             38.1           CAPILLARY BLOOD GLUCOSE              CSF:                  Radiology:    ACC: [id number] EXAM:  CT BRAIN PERFUSION MAPS STROKE   ORDERED BY:   JUAN MENESES     ACC: [id number] EXAM:  CT ANGIO BRAIN STROKE PROTC IC   ORDERED BY:   JUAN MENESES     ACC: [id number] EXAM:  CT BRAIN STROKE PROTOCOL   ORDERED BY: JUAN MENESES     ACC: [id number] EXAM:  CT ANGIO NECK STROKE PROTCL IC   ORDERED BY:   JUAN MENESES     PROCEDURE DATE:  11/14/2023          INTERPRETATION:  HEAD CT, CT PERFUSION, CTA OF THE [place] OF [place] AND   NECK:    INDICATIONS: Stroke Code. Vertigo.    TECHNIQUE:    HEAD CT:    Serial axial images were obtained from the skull base to the vertex   without the use of intravenous contrast. RAPID artificial intelligence   was used for perfusion analysis and for preliminary evaluation of   intracranial hemorrhage.    CTA [place] OF [place]:    After the intravenous power injection of non-ionic contrast material,   serial thin sections were obtained through the intracranial circulation   on a multislice CT scanner.  Images were reformatted using a dedicated 3D   software package and viewed on a dedicated workstation in multiple   planes. MIP image analysis was performed on a separate workstation.    CTA NECK:    After the intravenous power injection of non-ionic contrast material,   serial thin sections were obtained through the cervical circulation on a   multislice CT scanner.  Images were reformatted using a dedicated 3D   software package and viewed on a dedicated workstation in multiple   planes. MIP image analysis was performed on a separate workstation.    CONTRAST: 120 CC Omnipaque 350 was administered. 5 CC was discarded.      COMPARISON EXAMINATION: CT head 8/1/2016    FINDINGS:    VENTRICLES AND SULCI: Ventricles and sulci are unremarkable for patient   age.  INTRA-AXIAL: No intracranial mass, acute hemorrhage, or midline shift is   present. Chronic appearing left caudate lacunar infarct.  EXTRA-AXIAL: No extra-axial fluid collection is present.  INTRACRANIAL HEMORRHAGE: None.    VISUALIZED SINUSES: No air-fluid levels are identified.  VISUALIZED MASTOIDS:  Clear  CALVARIUM:  Intact      CT RAPID PERFUSION:  None.    INFARCT CORE: 0 mL.    TISSUE AT RISK: 0 mL.    MISMATCH RATIO: None.      CTA [place] OF [place]:    ANTERIOR CIRCULATION    ICA  CAVERNOUS, SUPRACLINOID, BIFURCATION SEGMENTS: Patent without flow   limiting stenosis.    ANTERIOR CEREBRAL ARTERIES: Bilateral A1, anterior communicating and A2   anterior cerebral arteries are unremarkable in course and caliber without   flow limiting stenosis.    MIDDLE CEREBRAL ARTERIES: Patent bilateral M1, M2, and distal MCA   branches without flow limiting stenosis. There is a anteriorly projecting   focal outpouching at the right MCA bifurcation measuring 3.2 x 4.0 mm   consistent with an aneurysm.    POSTERIOR CIRCULATION:    VERTEBRAL ARTERIES: Patent without flow limiting stenosis    BASILAR ARTERY: Patent no flow limiting stenosis.    POSTERIOR CEREBRAL ARTERIES: Patent without flow limiting stenosis.    CTA NECK:    GREAT VESSELS: Visualized segments are patent, no flow limiting stenosis.    COMMON CAROTID ARTERIES:  RIGHT: Patent without flow limiting stenosis  LEFT: Patent without flow limiting stenosis    INTERNAL CAROTID ARTERIES:  RIGHT: Patent no evidence for any hemodynamically significant stenosis at   the ICA origin by NASCET criteria.  LEFT: Patent no evidence for any hemodynamically significant stenosis at   the ICA origin by NASCET criteria.    VERTEBRAL ARTERIES:    RIGHT: Patent no evidence for any flow limiting stenosis.  LEFT: Patent no evidence for any flow limiting stenosis.      SOFT TISSUES: There is a 3.3 x 3.0 x 3.5 cm low-density nodule in the   left lobe of thyroid gland.    BONES: Multilevel degenerative changes of cervical spine.    IMPRESSION:    HEAD CT: No acute intracranial hemorrhage or acute territorial infarction.    Notification to clinician of alert:  Dr. Bryce Oliveira was notified about the noncontrast head CT finding at   10:56 AM on 11/14/2023 with readback confirmation. The opportunity for   questions was provided and all questions asked were answered.    CT PERFUSION demonstrated: No asymmetric or territorial perfusion   abnormality.    If symptoms persist consider follow-up imaging with MRI of the brain if   no contraindications.     CTA COW:  Patent intracranial circulation without flow limiting stenosis   or large vessel occlusion. 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    CTA NECK: Patent, ECAs, ICAs, no  hemodynamically significant stenosis at    ICA origins by NASCET criteria.  Bilateral vertebral arteries are patent without flow limiting stenosis.    There is a 3.5 cm nodule in the left lobe of the thyroid gland. Recommend   nonemergent follow-up with thyroid ultrasound.    --- End of Report ---            JULIO CESAR LUA MD; Attending Radiologist  This document has been electronically signed. Nov 14 2023 11:43AM   Neurology - Consult Note    -  Spectra: [phone] ([facility]), [phone] ([facility])  -    HPI: Patient JOSE ARMANDO WAY is a 78y (1945) woman with a PMHx significant for lower back pain (takes Aleve and Tylenol as needed) presents with sudden onset vertigo and gait disequilibrium.  She awoke at 5: 00 11/14/2023 and at around 7: 00 she experienced room spinning sensation and persistance of chornic bakc/neck pain. on exam and she noticed worse left-sided hearing loss, however on repeat exam it was worse on the right and internal was with variable reproductive reproducibility.  Otherwise she endorses chronic bilateral hearing issues.  She denies falls, clumsiness, dropping things, tinnitus, ear fullness, ear pain, vision loss, blurry vision, double vision, weakness, sensory changes.  Notes headache at first which is described as her chronic back and neck radiating to her head however this resolves a little bit chronic throughout encounter.  Her vertigo resolved after several minutes and recurs when she moves and resolves when she rests.  Vertigo is associated with "wobbly gait "    NKDA  Takes alleve (not in last 24 hours) and tylenol (2 this am d/t back pain) prn, otherwise no daily medications  no ac/ap    NIH stroke scale: 0  Last known normal 11/14/2307: 00  Not a tenecteplase candidate due to symptoms not's consistent with ischemic stroke  Thrombectomy candidate due to no LVO on CTA  preMRS 0    Review of Systems:    CONSTITUTIONAL: No fevers, always has chills  EYES AND ENT: No visual changes or no throat pain   NECK: +chronic neck pain  RESPIRATORY: No hemoptysis or shortness of breath  CARDIOVASCULAR: No chest pain or palpitations  GASTROINTESTINAL: No melena or hematochezia  GENITOURINARY: No dysuria or hematuria  NEUROLOGICAL: +As stated in HPI above  SKIN: No itching, burning, rashes, or lesions   All other review of systems is negative unless indicated above.    Allergies:  No Known Allergies      PMHx/PSHx/Family Hx: As above, otherwise see below   No pertinent past medical history        Social Hx:  No current use of tobacco, alcohol, or illicit drugs      Medications:  MEDICATIONS  (STANDING):    MEDICATIONS  (PRN):      Vitals:  T(C): 36.7 (11-14-23 @ 10:18), Max: 36.7 (11-14-23 @ 10:18)  HR: 95 (11-14-23 @ 10:18) (95 - 95)  BP: 176/86 (11-14-23 @ 10:18) (176/86 - 176/86)  RR: 16 (11-14-23 @ 10:18) (16 - 16)  SpO2: 98% (11-14-23 @ 10:18) (98% - 98%)    Physical Examination: INCOMPLETE  General - NAD  Cardiovascular - Peripheral pulses palpable, no edema  Eyes - Fundoscopy with flat, sharp optic discs and no hemorrhage or exudates; Fundoscopy not well visualized; Fundoscopy not performed due to safety precautions in the setting of the COVID-19 pandemic    Neurologic Exam:  Mental status - Awake, Alert, Oriented to person, place, and time. Speech fluent, repetition and naming intact. Follows simple and complex commands. Attention/concentration, recent and remote memory and fund of knowledge intact    Cranial nerves - PERRL, VFF, EOMI NO nystagmus, face sensation (V1-V3) intact b/l, facial strength intact without asymmetry b/l, hearing intact b/l, palate with symmetric elevation, trapezius 5/5 strength b/l, tongue midline on protrusion with full lateral movement    Motor - Normal bulk and tone throughout. No pronator drift.  Strength testing            Deltoid      Biceps      Triceps        R            5                 5               5                     5                             L             5                 5               5                     5                                          Hip Flexion    Hip Extension    Knee Flexion    Knee Extension    Dorsiflexion     R              5                           5                       5                           5                            5                       L              5                           5                        5                           5                            5                             Sensation - Light touch intact throughout    DTR's -             Biceps      Triceps     Brachioradialis      Patellar    Ankle      R             2+             2+                  2+                       2+            0+                   L              2+             2+                 2+                        2+           0+                     Coordination - Finger to Nose intact b/l. HS intact b/l No tremors appreciated    Gait and station -  False positive romberg. Eyes open falling firts to left then to back.    [place] hallpike negative b/l; was not symptomatic during test. When returned to seated position she became vertiginous again (<1min) without nystagmus also had intense neck pain (<2min in time)    Labs:                        12.6   4.07  )-----------( 207      ( 14 Nov 2023 10:40 )             38.1           CAPILLARY BLOOD GLUCOSE              CSF:                  Radiology:    ACC: [id number] EXAM:  CT BRAIN PERFUSION MAPS STROKE   ORDERED BY:   JUAN MENESES     ACC: [id number] EXAM:  CT ANGIO BRAIN STROKE PROTC IC   ORDERED BY:   JUAN MENESES     ACC: [id number] EXAM:  CT BRAIN STROKE PROTOCOL   ORDERED BY: JUAN MENESES     ACC: [id number] EXAM:  CT ANGIO NECK STROKE PROTCL IC   ORDERED BY:   JUAN MENESES     PROCEDURE DATE:  11/14/2023          INTERPRETATION:  HEAD CT, CT PERFUSION, CTA OF THE [place] OF [place]ANG AND   NECK:    INDICATIONS: Stroke Code. Vertigo.    TECHNIQUE:    HEAD CT:    Serial axial images were obtained from the skull base to the vertex   without the use of intravenous contrast. RAPID artificial intelligence   was used for perfusion analysis and for preliminary evaluation of   intracranial hemorrhage.    CTA [place] OF WANG:    After the intravenous power injection of non-ionic contrast material,   serial thin sections were obtained through the intracranial circulation   on a multislice CT scanner.  Images were reformatted using a dedicated 3D   software package and viewed on a dedicated workstation in multiple   planes. MIP image analysis was performed on a separate workstation.    CTA NECK:    After the intravenous power injection of non-ionic contrast material,   serial thin sections were obtained through the cervical circulation on a   multislice CT scanner.  Images were reformatted using a dedicated 3D   software package and viewed on a dedicated workstation in multiple   planes. MIP image analysis was performed on a separate workstation.    CONTRAST: 120 CC Omnipaque 350 was administered. 5 CC was discarded.      COMPARISON EXAMINATION: CT head 8/1/2016    FINDINGS:    VENTRICLES AND SULCI: Ventricles and sulci are unremarkable for patient   age.  INTRA-AXIAL: No intracranial mass, acute hemorrhage, or midline shift is   present. Chronic appearing left caudate lacunar infarct.  EXTRA-AXIAL: No extra-axial fluid collection is present.  INTRACRANIAL HEMORRHAGE: None.    VISUALIZED SINUSES: No air-fluid levels are identified.  VISUALIZED MASTOIDS:  Clear  CALVARIUM:  Intact      CT RAPID PERFUSION:  None.    INFARCT CORE: 0 mL.    TISSUE AT RISK: 0 mL.    MISMATCH RATIO: None.      CTA [place] OF WANG:    ANTERIOR CIRCULATION    ICA  CAVERNOUS, SUPRACLINOID, BIFURCATION SEGMENTS: Patent without flow   limiting stenosis.    ANTERIOR CEREBRAL ARTERIES: Bilateral A1, anterior communicating and A2   anterior cerebral arteries are unremarkable in course and caliber without   flow limiting stenosis.    MIDDLE CEREBRAL ARTERIES: Patent bilateral M1, M2, and distal MCA   branches without flow limiting stenosis. There is a anteriorly projecting   focal outpouching at the right MCA bifurcation measuring 3.2 x 4.0 mm   consistent with an aneurysm.    POSTERIOR CIRCULATION:    VERTEBRAL ARTERIES: Patent without flow limiting stenosis    BASILAR ARTERY: Patent no flow limiting stenosis.    POSTERIOR CEREBRAL ARTERIES: Patent without flow limiting stenosis.    CTA NECK:    GREAT VESSELS: Visualized segments are patent, no flow limiting stenosis.    COMMON CAROTID ARTERIES:  RIGHT: Patent without flow limiting stenosis  LEFT: Patent without flow limiting stenosis    INTERNAL CAROTID ARTERIES:  RIGHT: Patent no evidence for any hemodynamically significant stenosis at   the ICA origin by NASCET criteria.  LEFT: Patent no evidence for any hemodynamically significant stenosis at   the ICA origin by NASCET criteria.    VERTEBRAL ARTERIES:    RIGHT: Patent no evidence for any flow limiting stenosis.  LEFT: Patent no evidence for any flow limiting stenosis.      SOFT TISSUES: There is a 3.3 x 3.0 x 3.5 cm low-density nodule in the   left lobe of thyroid gland.    BONES: Multilevel degenerative changes of cervical spine.    IMPRESSION:    HEAD CT: No acute intracranial hemorrhage or acute territorial infarction.    Notification to clinician of alert:  Dr. Bryce Oliveira was notified about the noncontrast head CT finding at   10:56 AM on 11/14/2023 with readback confirmation. The opportunity for   questions was provided and all questions asked were answered.    CT PERFUSION demonstrated: No asymmetric or territorial perfusion   abnormality.    If symptoms persist consider follow-up imaging with MRI of the brain if   no contraindications.     CTA COW:  Patent intracranial circulation without flow limiting stenosis   or large vessel occlusion. 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    CTA NECK: Patent, ECAs, ICAs, no  hemodynamically significant stenosis at    ICA origins by NASCET criteria.  Bilateral vertebral arteries are patent without flow limiting stenosis.    There is a 3.5 cm nodule in the left lobe of the thyroid gland. Recommend   nonemergent follow-up with thyroid ultrasound.    --- End of Report ---            JULIO CESAR LUA MD; Attending Radiologist  This document has been electronically signed. Nov 14 2023 11:43AM   Neurology - Consult Note  -  Spectra: [phone] ([facility]), [phone] ([facility])  -  HPI: Patient JOSE ARMANDO WAY is a 78y (1945) woman with a PMHx significant for lower back pain (takes Aleve and Tylenol as needed) presents with sudden onset vertigo and gait disequilibrium.  She awoke at 5: 00 11/14/2023 and at around 7: 00 she experienced room spinning sensation and persistance of chornic bakc/neck pain. on exam and she noticed worse left-sided hearing loss, however on repeat exam it was worse on the right and internal was with variable reproductive reproducibility.  Otherwise she endorses chronic bilateral hearing issues.  She denies falls, clumsiness, dropping things, tinnitus, ear fullness, ear pain, vision loss, blurry vision, double vision, weakness, sensory changes.  Notes headache at first which is described as her chronic back and neck radiating to her head however this resolves a little bit chronic throughout encounter.  Her vertigo resolved after several minutes and recurs when she moves and resolves when she rests.  Vertigo is associated with "wobbly gait "    NKDA  Takes alleve (not in last 24 hours) and tylenol (2 this am d/t back pain) prn, otherwise no daily medications  no ac/ap    NIH stroke scale: 0  Last known normal 11/14/2307: 00  Not a tenecteplase candidate due to symptoms not's consistent with ischemic stroke  Thrombectomy candidate due to no LVO on CTA  preMRS 0    Review of Systems:    CONSTITUTIONAL: No fevers, always has chills  EYES AND ENT: No visual changes or no throat pain   NECK: +chronic neck pain  RESPIRATORY: No hemoptysis or shortness of breath  CARDIOVASCULAR: No chest pain or palpitations  GASTROINTESTINAL: No melena or hematochezia  GENITOURINARY: No dysuria or hematuria  NEUROLOGICAL: +As stated in HPI above  SKIN: No itching, burning, rashes, or lesions   All other review of systems is negative unless indicated above.    Allergies:  No Known Allergies      PMHx/PSHx/Family Hx: As above, otherwise see below   No pertinent past medical history    Social Hx: No current use of tobacco, alcohol, or illicit drugs    Medications:  MEDICATIONS  (STANDING):  MEDICATIONS  (PRN):    Vitals:  T(C): 36.7 (11-14-23 @ 10:18), Max: 36.7 (11-14-23 @ 10:18)  HR: 95 (11-14-23 @ 10:18) (95 - 95)  BP: 176/86 (11-14-23 @ 10:18) (176/86 - 176/86)  RR: 16 (11-14-23 @ 10:18) (16 - 16)  SpO2: 98% (11-14-23 @ 10:18) (98% - 98%)    Physical Examination: INCOMPLETE  General - NAD  Cardiovascular - Peripheral pulses palpable, no edema  Eyes - Fundoscopy with flat, sharp optic discs and no hemorrhage or exudates; Fundoscopy not well visualized; Fundoscopy not performed due to safety precautions in the setting of the COVID-19 pandemic    Neurologic Exam:  Mental status - Awake, Alert, Oriented to person, place, and time. Speech fluent, repetition and naming intact. Follows simple and complex commands. Attention/concentration, recent and remote memory and fund of knowledge intact    Cranial nerves - PERRL, VFF, EOMI NO nystagmus, face sensation (V1-V3) intact b/l, facial strength intact without asymmetry b/l, hearing intact b/l, palate with symmetric elevation, trapezius 5/5 strength b/l, tongue midline on protrusion with full lateral movement    Motor - Normal bulk and tone throughout. No pronator drift.  Strength testing            Deltoid      Biceps      Triceps        R            5                 5               5                     5                             L             5                 5               5                     5                                          Hip Flexion    Hip Extension    Knee Flexion    Knee Extension    Dorsiflexion     R              5                           5                       5                           5                            5                       L              5                           5                        5                           5                            5                             Sensation - Light touch intact throughout    DTR's -             Biceps      Triceps     Brachioradialis      Patellar    Ankle      R             2+             2+                  2+                       2+            0+                   L              2+             2+                 2+                        2+           0+                     Coordination - Finger to Nose intact b/l. HS intact b/l No tremors appreciated    Gait and station -  False positive romberg. Eyes open falling firts to left then to back.    [place] hallpike negative b/l; was not symptomatic during test. When returned to seated position she became vertiginous again (<1min) without nystagmus also had intense neck pain (<2min in time)    Labs:                        12.6   4.07  )-----------( 207      ( 14 Nov 2023 10:40 )             38.1     Radiology:    ACC: [id number] EXAM:  CT BRAIN PERFUSION MAPS STROKE   ORDERED BY:   JUAN MENESES   ACC: [id number] EXAM:  CT ANGIO BRAIN STROKE PROTC IC   ORDERED BY:   JUAN MENESES     PROCEDURE DATE:  11/14/2023      INTERPRETATION:  HEAD CT, CT PERFUSION, CTA OF THE [place] OF WANG AND   NECK:    INDICATIONS: Stroke Code. Vertigo.  TECHNIQUE:  HEAD CT:    Serial axial images were obtained from the skull base to the vertex   without the use of intravenous contrast. RAPID artificial intelligence   was used for perfusion analysis and for preliminary evaluation of   intracranial hemorrhage.    CTA [place] OF WANG:    After the intravenous power injection of non-ionic contrast material,   serial thin sections were obtained through the intracranial circulation   on a multislice CT scanner.  Images were reformatted using a dedicated 3D   software package and viewed on a dedicated workstation in multiple   planes. MIP image analysis was performed on a separate workstation.    CTA NECK:    After the intravenous power injection of non-ionic contrast material,   serial thin sections were obtained through the cervical circulation on a   multislice CT scanner.  Images were reformatted using a dedicated 3D   software package and viewed on a dedicated workstation in multiple   planes. MIP image analysis was performed on a separate workstation.    CONTRAST: 120 CC Omnipaque 350 was administered. 5 CC was discarded.      COMPARISON EXAMINATION: CT head 8/1/2016    FINDINGS:    VENTRICLES AND SULCI: Ventricles and sulci are unremarkable for patient   age.  INTRA-AXIAL: No intracranial mass, acute hemorrhage, or midline shift is   present. Chronic appearing left caudate lacunar infarct.  EXTRA-AXIAL: No extra-axial fluid collection is present.  INTRACRANIAL HEMORRHAGE: None.    VISUALIZED SINUSES: No air-fluid levels are identified.  VISUALIZED MASTOIDS:  Clear  CALVARIUM:  Intact      CT RAPID PERFUSION:  None.    INFARCT CORE: 0 mL.    TISSUE AT RISK: 0 mL.    MISMATCH RATIO: None.      CTA [place] OF WANG:    ANTERIOR CIRCULATION    ICA  CAVERNOUS, SUPRACLINOID, BIFURCATION SEGMENTS: Patent without flow   limiting stenosis.    ANTERIOR CEREBRAL ARTERIES: Bilateral A1, anterior communicating and A2   anterior cerebral arteries are unremarkable in course and caliber without   flow limiting stenosis.    MIDDLE CEREBRAL ARTERIES: Patent bilateral M1, M2, and distal MCA   branches without flow limiting stenosis. There is a anteriorly projecting   focal outpouching at the right MCA bifurcation measuring 3.2 x 4.0 mm   consistent with an aneurysm.    POSTERIOR CIRCULATION:    VERTEBRAL ARTERIES: Patent without flow limiting stenosis    BASILAR ARTERY: Patent no flow limiting stenosis.    POSTERIOR CEREBRAL ARTERIES: Patent without flow limiting stenosis.    CTA NECK:    GREAT VESSELS: Visualized segments are patent, no flow limiting stenosis.    COMMON CAROTID ARTERIES:  RIGHT: Patent without flow limiting stenosis  LEFT: Patent without flow limiting stenosis    INTERNAL CAROTID ARTERIES:  RIGHT: Patent no evidence for any hemodynamically significant stenosis at   the ICA origin by NASCET criteria.  LEFT: Patent no evidence for any hemodynamically significant stenosis at   the ICA origin by NASCET criteria.    VERTEBRAL ARTERIES:    RIGHT: Patent no evidence for any flow limiting stenosis.  LEFT: Patent no evidence for any flow limiting stenosis.      SOFT TISSUES: There is a 3.3 x 3.0 x 3.5 cm low-density nodule in the   left lobe of thyroid gland.    BONES: Multilevel degenerative changes of cervical spine.    IMPRESSION:    HEAD CT: No acute intracranial hemorrhage or acute territorial infarction.    Notification to clinician of alert:  Dr. Bryce Oliveira was notified about the noncontrast head CT finding at   10:56 AM on 11/14/2023 with readback confirmation. The opportunity for   questions was provided and all questions asked were answered.    CT PERFUSION demonstrated: No asymmetric or territorial perfusion   abnormality.    If symptoms persist consider follow-up imaging with MRI of the brain if   no contraindications.     CTA COW:  Patent intracranial circulation without flow limiting stenosis   or large vessel occlusion. 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    CTA NECK: Patent, ECAs, ICAs, no  hemodynamically significant stenosis at    ICA origins by NASCET criteria.  Bilateral vertebral arteries are patent without flow limiting stenosis.    There is a 3.5 cm nodule in the left lobe of the thyroid gland. Recommend   nonemergent follow-up with thyroid ultrasound.    --- End of Report ---            JULIO CESAR LUA MD; Attending Radiologist  This document has been electronically signed. Nov 14 2023 11:43AM   Neurology - Consult Note  -  Spectra: [phone] ([facility]), [phone] ([facility])  -  HPI: Patient JOSE ARMANDO WAY is a 78y (1945) woman with a PMHx significant for lower back pain (takes Aleve and Tylenol as needed) presents with sudden onset vertigo and gait disequilibrium.  She awoke at 5: 00 11/14/2023 and at around 7: 00 she experienced room spinning sensation and persistance of chornic bakc/neck pain. on exam and she noticed worse left-sided hearing loss, however on repeat exam it was worse on the right and internal was with variable reproductive reproducibility.  Otherwise she endorses chronic bilateral hearing issues.  She denies falls, clumsiness, dropping things, tinnitus, ear fullness, ear pain, vision loss, blurry vision, double vision, weakness, sensory changes.  Notes headache at first which is described as her chronic back and neck radiating to her head however this resolves a little bit chronic throughout encounter.  Her vertigo resolved after several minutes and recurs when she moves and resolves when she rests.  Vertigo is associated with "wobbly gait "    NKDA  Takes alleve (not in last 24 hours) and tylenol (2 this am d/t back pain) prn, otherwise no daily medications  no ac/ap    NIH stroke scale: 0  Last known normal 11/14/2307: 00  Not a tenecteplase candidate due to symptoms not's consistent with ischemic stroke  Thrombectomy candidate due to no LVO on CTA  preMRS 0    Review of Systems:    CONSTITUTIONAL: No fevers, always has chills  EYES AND ENT: No visual changes or no throat pain   NECK: +chronic neck pain  RESPIRATORY: No hemoptysis or shortness of breath  CARDIOVASCULAR: No chest pain or palpitations  GASTROINTESTINAL: No melena or hematochezia  GENITOURINARY: No dysuria or hematuria  NEUROLOGICAL: +As stated in HPI above  SKIN: No itching, burning, rashes, or lesions   All other review of systems is negative unless indicated above.    Allergies:  No Known Allergies      PMHx/PSHx/Family Hx: As above, otherwise see below   No pertinent past medical history    Social Hx: No current use of tobacco, alcohol, or illicit drugs    Medications:  MEDICATIONS  (STANDING):  MEDICATIONS  (PRN):    Vitals:  T(C): 36.7 (11-14-23 @ 10:18), Max: 36.7 (11-14-23 @ 10:18)  HR: 95 (11-14-23 @ 10:18) (95 - 95)  BP: 176/86 (11-14-23 @ 10:18) (176/86 - 176/86)  RR: 16 (11-14-23 @ 10:18) (16 - 16)  SpO2: 98% (11-14-23 @ 10:18) (98% - 98%)    Physical Examination: INCOMPLETE  General - NAD  Cardiovascular - Peripheral pulses palpable, no edema  Eyes - Fundoscopy with flat, sharp optic discs and no hemorrhage or exudates; Fundoscopy not well visualized; Fundoscopy not performed due to safety precautions in the setting of the COVID-19 pandemic    Neurologic Exam:  Mental status - Awake, Alert, Oriented to person, place, and time. Speech fluent, repetition and naming intact. Follows simple and complex commands. Attention/concentration, recent and remote memory and fund of knowledge intact    Cranial nerves - PERRL, VFF, EOMI NO nystagmus, face sensation (V1-V3) intact b/l, facial strength intact without asymmetry b/l, hearing intact b/l, palate with symmetric elevation, trapezius 5/5 strength b/l, tongue midline on protrusion with full lateral movement    Motor - Normal bulk and tone throughout. No pronator drift.  Strength testing            Deltoid      Biceps      Triceps        R            5                 5               5                     5                             L             5                 5               5                     5                                          Hip Flexion    Hip Extension    Knee Flexion    Knee Extension    Dorsiflexion     R              5                           5                       5                           5                            5                       L              5                           5                        5                           5                            5                             Sensation - Light touch intact throughout    DTR's -             Biceps      Triceps     Brachioradialis      Patellar    Ankle      R             2+             2+                  2+                       2+            0+                   L              2+             2+                 2+                        2+           0+                     Coordination - Finger to Nose intact b/l. HS intact b/l No tremors appreciated    Gait and station -  False positive romberg. Eyes open falling firts to left then to back.    Rupa hallpike negative b/l; was not symptomatic during test. When returned to seated position she became vertiginous again (<1min) without nystagmus also had intense neck pain (<2min in time)    Labs:                        12.6   4.07  )-----------( 207      ( 14 Nov 2023 10:40 )             38.1     Radiology:    ACC: [id number] EXAM:  CT BRAIN PERFUSION MAPS STROKE   ORDERED BY:   JUAN MENESES   ACC: [id number] EXAM:  CT ANGIO BRAIN STROKE PROTC IC   ORDERED BY:   JUAN MENESES     PROCEDURE DATE:  11/14/2023      INTERPRETATION:  HEAD CT, CT PERFUSION, CTA OF THE [place] OF WANG AND   NECK:    INDICATIONS: Stroke Code. Vertigo.  TECHNIQUE:  HEAD CT:    Serial axial images were obtained from the skull base to the vertex   without the use of intravenous contrast. RAPID artificial intelligence   was used for perfusion analysis and for preliminary evaluation of   intracranial hemorrhage.    CTA [place] OF WANG:    After the intravenous power injection of non-ionic contrast material,   serial thin sections were obtained through the intracranial circulation   on a multislice CT scanner.  Images were reformatted using a dedicated 3D   software package and viewed on a dedicated workstation in multiple   planes. MIP image analysis was performed on a separate workstation.    CTA NECK:    After the intravenous power injection of non-ionic contrast material,   serial thin sections were obtained through the cervical circulation on a   multislice CT scanner.  Images were reformatted using a dedicated 3D   software package and viewed on a dedicated workstation in multiple   planes. MIP image analysis was performed on a separate workstation.    CONTRAST: 120 CC Omnipaque 350 was administered. 5 CC was discarded.      COMPARISON EXAMINATION: CT head 8/1/2016    FINDINGS:    VENTRICLES AND SULCI: Ventricles and sulci are unremarkable for patient   age.  INTRA-AXIAL: No intracranial mass, acute hemorrhage, or midline shift is   present. Chronic appearing left caudate lacunar infarct.  EXTRA-AXIAL: No extra-axial fluid collection is present.  INTRACRANIAL HEMORRHAGE: None.    VISUALIZED SINUSES: No air-fluid levels are identified.  VISUALIZED MASTOIDS:  Clear  CALVARIUM:  Intact      CT RAPID PERFUSION:  None.    INFARCT CORE: 0 mL.    TISSUE AT RISK: 0 mL.    MISMATCH RATIO: None.      CTA [place] OF WANG:    ANTERIOR CIRCULATION    ICA  CAVERNOUS, SUPRACLINOID, BIFURCATION SEGMENTS: Patent without flow   limiting stenosis.    ANTERIOR CEREBRAL ARTERIES: Bilateral A1, anterior communicating and A2   anterior cerebral arteries are unremarkable in course and caliber without   flow limiting stenosis.    MIDDLE CEREBRAL ARTERIES: Patent bilateral M1, M2, and distal MCA   branches without flow limiting stenosis. There is a anteriorly projecting   focal outpouching at the right MCA bifurcation measuring 3.2 x 4.0 mm   consistent with an aneurysm.    POSTERIOR CIRCULATION:    VERTEBRAL ARTERIES: Patent without flow limiting stenosis    BASILAR ARTERY: Patent no flow limiting stenosis.    POSTERIOR CEREBRAL ARTERIES: Patent without flow limiting stenosis.    CTA NECK:    GREAT VESSELS: Visualized segments are patent, no flow limiting stenosis.    COMMON CAROTID ARTERIES:  RIGHT: Patent without flow limiting stenosis  LEFT: Patent without flow limiting stenosis    INTERNAL CAROTID ARTERIES:  RIGHT: Patent no evidence for any hemodynamically significant stenosis at   the ICA origin by NASCET criteria.  LEFT: Patent no evidence for any hemodynamically significant stenosis at   the ICA origin by NASCET criteria.    VERTEBRAL ARTERIES:    RIGHT: Patent no evidence for any flow limiting stenosis.  LEFT: Patent no evidence for any flow limiting stenosis.      SOFT TISSUES: There is a 3.3 x 3.0 x 3.5 cm low-density nodule in the   left lobe of thyroid gland.    BONES: Multilevel degenerative changes of cervical spine.    IMPRESSION:    HEAD CT: No acute intracranial hemorrhage or acute territorial infarction.    Notification to clinician of alert:  Dr. Bryce Oliveira was notified about the noncontrast head CT finding at   10:56 AM on 11/14/2023 with readback confirmation. The opportunity for   questions was provided and all questions asked were answered.    CT PERFUSION demonstrated: No asymmetric or territorial perfusion   abnormality.    If symptoms persist consider follow-up imaging with MRI of the brain if   no contraindications.     CTA COW:  Patent intracranial circulation without flow limiting stenosis   or large vessel occlusion. 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    CTA NECK: Patent, ECAs, ICAs, no  hemodynamically significant stenosis at    ICA origins by NASCET criteria.  Bilateral vertebral arteries are patent without flow limiting stenosis.    There is a 3.5 cm nodule in the left lobe of the thyroid gland. Recommend   nonemergent follow-up with thyroid ultrasound.    --- End of Report ---            JULIO CESAR LUA MD; Attending Radiologist  This document has been electronically signed. Nov 14 2023 11:43AM   Neurology - Consult Note  -  Spectra: [phone] ([facility]), [phone] ([facility])  -  HPI: Patient JOSE ARMANDO WAY is a 78y (1945) woman with a PMHx significant for lower back pain (takes Aleve and Tylenol as needed) presents with sudden onset vertigo and gait disequilibrium.  She awoke at 5: 00 11/14/2023 and at around 7: 00 she experienced room spinning sensation and persistance of chornic bakc/neck pain. on exam and she noticed worse left-sided hearing loss, however on repeat exam it was worse on the right and internal was with variable reproductive reproducibility.  Otherwise she endorses chronic bilateral hearing issues.  She denies falls, clumsiness, dropping things, tinnitus, ear fullness, ear pain, vision loss, blurry vision, double vision, weakness, sensory changes.  Notes headache at first which is described as her chronic back and neck radiating to her head however this resolves a little bit chronic throughout encounter.  Her vertigo resolved after several minutes and recurs when she moves and resolves when she rests.  Vertigo is associated with "wobbly gait "    NKDA  Takes alleve (not in last 24 hours) and tylenol (2 this am d/t back pain) prn, otherwise no daily medications  no ac/ap    NIH stroke scale: 0  Last known normal 11/14/2307: 00  Not a tenecteplase candidate due to symptoms not's consistent with ischemic stroke  Thrombectomy candidate due to no LVO on CTA  preMRS 0    Review of Systems:    CONSTITUTIONAL: No fevers, always has chills  EYES AND ENT: No visual changes or no throat pain   NECK: +chronic neck pain  RESPIRATORY: No hemoptysis or shortness of breath  CARDIOVASCULAR: No chest pain or palpitations  GASTROINTESTINAL: No melena or hematochezia  GENITOURINARY: No dysuria or hematuria  NEUROLOGICAL: +As stated in HPI above  SKIN: No itching, burning, rashes, or lesions   All other review of systems is negative unless indicated above.    Allergies:  No Known Allergies      PMHx/PSHx/Family Hx: As above, otherwise see below   No pertinent past medical history    Social Hx: No current use of tobacco, alcohol, or illicit drugs    Medications:  MEDICATIONS  (STANDING):  MEDICATIONS  (PRN):    Vitals:  T(C): 36.7 (11-14-23 @ 10:18), Max: 36.7 (11-14-23 @ 10:18)  HR: 95 (11-14-23 @ 10:18) (95 - 95)  BP: 176/86 (11-14-23 @ 10:18) (176/86 - 176/86)  RR: 16 (11-14-23 @ 10:18) (16 - 16)  SpO2: 98% (11-14-23 @ 10:18) (98% - 98%)    Physical Examination: INCOMPLETE  General - NAD  Cardiovascular - Peripheral pulses palpable, no edema  Eyes - Fundoscopy with flat, sharp optic discs and no hemorrhage or exudates; Fundoscopy not well visualized; Fundoscopy not performed due to safety precautions in the setting of the COVID-19 pandemic    Neurologic Exam:  Mental status - Awake, Alert, Oriented to person, place, and time. Speech fluent, repetition and naming intact. Follows simple and complex commands. Attention/concentration, recent and remote memory and fund of knowledge intact    Cranial nerves - PERRL, VFF, EOMI NO nystagmus, face sensation (V1-V3) intact b/l, facial strength intact without asymmetry b/l, hearing intact b/l, palate with symmetric elevation, trapezius 5/5 strength b/l, tongue midline on protrusion with full lateral movement    Motor - Normal bulk and tone throughout. No pronator drift.  Strength testing            Deltoid      Biceps      Triceps        R            5                 5               5                     5                             L             5                 5               5                     5                                          Hip Flexion    Hip Extension    Knee Flexion    Knee Extension    Dorsiflexion     R              5                           5                       5                           5                            5                       L              5                           5                        5                           5                            5                             Sensation - Light touch intact throughout    DTR's -             Biceps      Triceps     Brachioradialis      Patellar    Ankle      R             2+             2+                  2+                       2+            0+                   L              2+             2+                 2+                        2+           0+                     Coordination - Finger to Nose intact b/l. HS intact b/l No tremors appreciated    Gait and station -  False positive romberg. Eyes open falling firts to left then to back.    Rupa hallpike negative b/l; was not symptomatic during test. When returned to seated position she became vertiginous again (<1min) without nystagmus also had intense neck pain (<2min in time)    Labs:                        12.6   4.07  )-----------( 207      ( 14 Nov 2023 10:40 )             38.1     Radiology:    ACC: [id number] EXAM:  CT BRAIN PERFUSION MAPS STROKE   ORDERED BY:   JUAN MENESES   ACC: [id number] EXAM:  CT ANGIO BRAIN STROKE PROTC IC   ORDERED BY:   JUAN MENESES     PROCEDURE DATE:  11/14/2023      INTERPRETATION:  HEAD CT, CT PERFUSION, CTA OF THE [place] OF WANG AND   NECK:    INDICATIONS: Stroke Code. Vertigo.  TECHNIQUE:  HEAD CT:    Serial axial images were obtained from the skull base to the vertex   without the use of intravenous contrast. RAPID artificial intelligence   was used for perfusion analysis and for preliminary evaluation of   intracranial hemorrhage.    CTA [place] OF WANG:    After the intravenous power injection of non-ionic contrast material,   serial thin sections were obtained through the intracranial circulation   on a multislice CT scanner.  Images were reformatted using a dedicated 3D   software package and viewed on a dedicated workstation in multiple   planes. MIP image analysis was performed on a separate workstation.    CTA NECK:    After the intravenous power injection of non-ionic contrast material,   serial thin sections were obtained through the cervical circulation on a   multislice CT scanner.  Images were reformatted using a dedicated 3D   software package and viewed on a dedicated workstation in multiple   planes. MIP image analysis was performed on a separate workstation.    CONTRAST: 120 CC Omnipaque 350 was administered. 5 CC was discarded.      COMPARISON EXAMINATION: CT head 8/1/2016    FINDINGS:    VENTRICLES AND SULCI: Ventricles and sulci are unremarkable for patient   age.  INTRA-AXIAL: No intracranial mass, acute hemorrhage, or midline shift is   present. Chronic appearing left caudate lacunar infarct.  EXTRA-AXIAL: No extra-axial fluid collection is present.  INTRACRANIAL HEMORRHAGE: None.    VISUALIZED SINUSES: No air-fluid levels are identified.  VISUALIZED MASTOIDS:  Clear  CALVARIUM:  Intact      CT RAPID PERFUSION:  None.    INFARCT CORE: 0 mL.    TISSUE AT RISK: 0 mL.    MISMATCH RATIO: None.      CTA [place] OF WANG:    ANTERIOR CIRCULATION    ICA  CAVERNOUS, SUPRACLINOID, BIFURCATION SEGMENTS: Patent without flow   limiting stenosis.    ANTERIOR CEREBRAL ARTERIES: Bilateral A1, anterior communicating and A2   anterior cerebral arteries are unremarkable in course and caliber without   flow limiting stenosis.    MIDDLE CEREBRAL ARTERIES: Patent bilateral M1, M2, and distal MCA   branches without flow limiting stenosis. There is a anteriorly projecting   focal outpouching at the right MCA bifurcation measuring 3.2 x 4.0 mm   consistent with an aneurysm.    POSTERIOR CIRCULATION:    VERTEBRAL ARTERIES: Patent without flow limiting stenosis    BASILAR ARTERY: Patent no flow limiting stenosis.    POSTERIOR CEREBRAL ARTERIES: Patent without flow limiting stenosis.    CTA NECK:    GREAT VESSELS: Visualized segments are patent, no flow limiting stenosis.    COMMON CAROTID ARTERIES:  RIGHT: Patent without flow limiting stenosis  LEFT: Patent without flow limiting stenosis    INTERNAL CAROTID ARTERIES:  RIGHT: Patent no evidence for any hemodynamically significant stenosis at   the ICA origin by NASCET criteria.  LEFT: Patent no evidence for any hemodynamically significant stenosis at   the ICA origin by NASCET criteria.    VERTEBRAL ARTERIES:    RIGHT: Patent no evidence for any flow limiting stenosis.  LEFT: Patent no evidence for any flow limiting stenosis.      SOFT TISSUES: There is a 3.3 x 3.0 x 3.5 cm low-density nodule in the   left lobe of thyroid gland.    BONES: Multilevel degenerative changes of cervical spine.    IMPRESSION:    HEAD CT: No acute intracranial hemorrhage or acute territorial infarction.    Notification to clinician of alert:  Dr. Bryce Oliveira was notified about the noncontrast head CT finding at   10:56 AM on 11/14/2023 with readback confirmation. The opportunity for   questions was provided and all questions asked were answered.    CT PERFUSION demonstrated: No asymmetric or territorial perfusion   abnormality.    If symptoms persist consider follow-up imaging with MRI of the brain if   no contraindications.     CTA COW:  Patent intracranial circulation without flow limiting stenosis   or large vessel occlusion. 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    CTA NECK: Patent, ECAs, ICAs, no  hemodynamically significant stenosis at    ICA origins by NASCET criteria.  Bilateral vertebral arteries are patent without flow limiting stenosis.    There is a 3.5 cm nodule in the left lobe of the thyroid gland. Recommend   nonemergent follow-up with thyroid ultrasound.    --- End of Report ---            JULIO CESAR LUA MD; Attending Radiologist  This document has been electronically signed. Nov 14 2023 11:43AM

## 2023-11-14 NOTE — ED ADULT NURSE NOTE - CAS ELECT INFOMATION PROVIDED
Follow up with PMD, neurologist and neurosurgery, return for any worsening symptoms./DC instructions

## 2023-11-14 NOTE — ED PROVIDER NOTE - PHYSICAL EXAMINATION
PHGENERAL: NAD  HEENT:  Atraumatic  CHEST/LUNG: Chest rise equal bilaterally  HEART: Regular rate and rhythm  ABDOMEN: Soft, Nontender, Nondistended  EXTREMITIES:  Extremities warm  PSYCH: A&Ox3  SKIN: No obvious rashes or lesions  NEUROLOGY: strength and sensation intact in all extremities. CN 2 - 12 intact. No pronator drift. Ambulatory without difficulty. Benign finger to nose testing

## 2023-11-14 NOTE — ED PROVIDER NOTE - ATTENDING CONTRIBUTION TO CARE
I was the supervising attending. I have independently seen face-to-face and examined the patient. I have reviewed the history and physical and discussed the MDM with the resident, fellow, LOCO and/or student. I agree with the assessment and plan as presented unless otherwise documented as follows:    78-year-old female, denies PMH, PSH of appendectomy, presenting as a stroke code for dizziness/imbalance.  Last known normal at 5 AM when she woke this morning, endorses onset of room spinning sensation at approximately 7 AM with unsteady gait/difficulty ambulating.  Otherwise denies ear pain/fullness, fevers or chills, chest pain or difficulty breathing, headache, nausea or vomiting.  Exam grossly nonfocal apart from unsteady gait/inability to ambulate.  Stroke code activated, neuro at bedside.  We will follow-up labs, CT, neuro recommendations. -Ivette Sparks MD (Attending)

## 2023-11-14 NOTE — ED ADULT NURSE NOTE - NSFALLRISKINTERV_ED_ALL_ED

## 2023-11-14 NOTE — ED ADULT NURSE NOTE - OBJECTIVE STATEMENT
78Y F AXO3 chronic back pain, bilateral hearing loss and no pertinent PSH presented to the ED via ambulance c/o unsteady gait since 7 am this morning. Last known well was 9 p.m. last night. Pt states she woke up various times during the night. Pt endorses dizziness and sensation of "room spinning". 78Y F AXO3 chronic back pain, bilateral hearing loss and no pertinent PSH presented to the ED via ambulance c/o unsteady gait since 7 am this morning. Last known well was 9 p.m. last night. Pt states she woke up various times during the night. Pt endorses dizziness and sensation of "room spinning". Upon arrival to the ED, pt is well appearing, has bilateral even and unlabored chest rise, and ambulatory with assistance. Upon assessment, pt has even and bilateral peripheral pulses, ROM, PERRLA, gross neuro intact, and soft, non-tender, non-distended abdomen. Pt denies fevers, chills, chest pain, SOB, n/v/d, numbness or tingling of extremities, vision changes, urinary symptoms, and black or bloody stools. IV access obtained, bed in lowest position, and side rails up. Comfort and safety provided.

## 2023-11-14 NOTE — ED PROVIDER NOTE - NSFOLLOWUPINSTRUCTIONS_ED_ALL_ED_FT
Dizziness    Dizziness can manifest as a feeling of unsteadiness or light-headedness. You may feel like you are about to faint. This condition can be caused by a number of things, including medicines, dehydration, or illness. Drink enough fluid to keep your urine clear or pale yellow. Do not drink alcohol and limit your caffeine intake. Avoid quick or sudden movements.  Rise slowly from chairs and steady yourself until you feel okay. In the morning, first sit up on the side of the bed.    SEEK IMMEDIATE MEDICAL CARE IF YOU HAVE ANY OF THE FOLLOWING SYMPTOMS: vomiting, changes in your vision or speech, weakness in your arms or legs, trouble speaking or swallowing, chest pain, abdominal pain, shortness of breath, sweating, bleeding, headache, neck pain, or fever.     The results of any blood tests and imaging studies completed during your visit today were discussed and explained to you and a copy provided with your discharge instructions. Please follow up with your primary care doctor and neurologist within 48 hours. Dizziness    Dizziness can manifest as a feeling of unsteadiness or light-headedness. You may feel like you are about to faint. This condition can be caused by a number of things, including medicines, dehydration, or illness. Drink enough fluid to keep your urine clear or pale yellow. Do not drink alcohol and limit your caffeine intake. Avoid quick or sudden movements.  Rise slowly from chairs and steady yourself until you feel okay. In the morning, first sit up on the side of the bed.    SEEK IMMEDIATE MEDICAL CARE IF YOU HAVE ANY OF THE FOLLOWING SYMPTOMS: vomiting, changes in your vision or speech, weakness in your arms or legs, trouble speaking or swallowing, chest pain, abdominal pain, shortness of breath, sweating, bleeding, headache, neck pain, or fever.     The results of any blood tests and imaging studies completed during your visit today were discussed and explained to you and a copy provided with your discharge instructions. Please follow up with your primary care doctor, neurosurgeon, and neurologist within 48 hours.

## 2023-11-14 NOTE — ED PROVIDER NOTE - CLINICAL SUMMARY MEDICAL DECISION MAKING FREE TEXT BOX
77 y/o female w/o PMH w/ PSH appendectomy, tonsillectomy c/o imbalance in gait and vertigo room spinning sensation. Pt's symptoms began after making coffee and standing up. Pt admits to worsening bilateral ear hearing (pt had chronic hearing problems). Pt is still symptomatic at this time. Otherwise asymptomatic. Denies hearing changes, vision changes. Denies fevers, chills, nausea, vomiting, chest pain, SOB, abdominal pain, dysuria, hematuria. Benign neuro examination. Given symptoms, will activate CODE STROKE, draw bloodwork, CT imaging, and reassess w/ neuro consultation.     LAST KNOWN NORMAL: 7am this morning (3.5 hours prior to ED arrival)

## 2023-11-15 ENCOUNTER — TRANSCRIPTION ENCOUNTER (OUTPATIENT)
Age: 78
End: 2023-11-15

## 2023-11-15 NOTE — ED POST DISCHARGE NOTE - DETAILS
11/15: spoke with patients son, aware of findings and states she had a thyroid US last week, and have follow up with pmd tomorrow to discuss. - Colleen Carrillo PA-C